# Patient Record
Sex: MALE | Race: WHITE | NOT HISPANIC OR LATINO | Employment: UNEMPLOYED | URBAN - METROPOLITAN AREA
[De-identification: names, ages, dates, MRNs, and addresses within clinical notes are randomized per-mention and may not be internally consistent; named-entity substitution may affect disease eponyms.]

---

## 2022-02-02 ENCOUNTER — OFFICE VISIT (OUTPATIENT)
Dept: GASTROENTEROLOGY | Facility: CLINIC | Age: 32
End: 2022-02-02
Payer: COMMERCIAL

## 2022-02-02 VITALS
HEIGHT: 71 IN | HEART RATE: 102 BPM | WEIGHT: 315 LBS | BODY MASS INDEX: 44.1 KG/M2 | SYSTOLIC BLOOD PRESSURE: 157 MMHG | DIASTOLIC BLOOD PRESSURE: 89 MMHG

## 2022-02-02 DIAGNOSIS — K62.5 RECTAL BLEEDING: Primary | ICD-10-CM

## 2022-02-02 DIAGNOSIS — E66.01 CLASS 3 SEVERE OBESITY DUE TO EXCESS CALORIES WITH SERIOUS COMORBIDITY AND BODY MASS INDEX (BMI) OF 60.0 TO 69.9 IN ADULT (HCC): ICD-10-CM

## 2022-02-02 PROBLEM — E66.813 CLASS 3 SEVERE OBESITY DUE TO EXCESS CALORIES WITH SERIOUS COMORBIDITY AND BODY MASS INDEX (BMI) OF 60.0 TO 69.9 IN ADULT (HCC): Status: ACTIVE | Noted: 2022-02-02

## 2022-02-02 PROCEDURE — 99204 OFFICE O/P NEW MOD 45 MIN: CPT | Performed by: INTERNAL MEDICINE

## 2022-02-02 RX ORDER — MONTELUKAST SODIUM 10 MG/1
10 TABLET ORAL DAILY
COMMUNITY
Start: 2012-09-22 | End: 2022-07-31

## 2022-02-02 RX ORDER — GEMFIBROZIL 600 MG/1
600 TABLET, FILM COATED ORAL
COMMUNITY

## 2022-02-02 RX ORDER — ALBUTEROL SULFATE 2.5 MG/3ML
2.5 SOLUTION RESPIRATORY (INHALATION)
COMMUNITY
Start: 2021-10-19

## 2022-02-02 RX ORDER — CLONAZEPAM 2 MG/1
TABLET ORAL 2 TIMES DAILY
COMMUNITY
Start: 2021-12-01

## 2022-02-02 RX ORDER — SILODOSIN 8 MG/1
8 CAPSULE ORAL DAILY
COMMUNITY

## 2022-02-02 RX ORDER — PRAZOSIN HYDROCHLORIDE 5 MG/1
CAPSULE ORAL
COMMUNITY
Start: 2021-12-01

## 2022-02-02 RX ORDER — DOCUSATE SODIUM 100 MG/1
100 CAPSULE, LIQUID FILLED ORAL 2 TIMES DAILY
COMMUNITY

## 2022-02-02 RX ORDER — GABAPENTIN 400 MG/1
CAPSULE ORAL
COMMUNITY
Start: 2021-12-01

## 2022-02-02 RX ORDER — BENZONATATE 100 MG/1
100 CAPSULE ORAL
COMMUNITY
Start: 2022-02-01 | End: 2022-02-11

## 2022-02-02 RX ORDER — FLUTICASONE FUROATE AND VILANTEROL 100; 25 UG/1; UG/1
1 POWDER RESPIRATORY (INHALATION)
COMMUNITY

## 2022-02-02 RX ORDER — ALBUTEROL SULFATE 90 UG/1
2 AEROSOL, METERED RESPIRATORY (INHALATION)
COMMUNITY
Start: 2021-12-27 | End: 2022-03-27

## 2022-02-02 RX ORDER — CHLORPROMAZINE HYDROCHLORIDE 100 MG/1
100 TABLET, FILM COATED ORAL 3 TIMES DAILY
COMMUNITY
Start: 2021-12-01

## 2022-02-02 RX ORDER — LITHIUM CARBONATE 300 MG/1
300 CAPSULE ORAL 2 TIMES DAILY
COMMUNITY
Start: 2021-12-01

## 2022-02-02 RX ORDER — BENZTROPINE MESYLATE 1 MG/1
TABLET ORAL
COMMUNITY
Start: 2021-12-01

## 2022-02-02 RX ORDER — BUPROPION HYDROCHLORIDE 150 MG/1
300 TABLET ORAL EVERY MORNING
COMMUNITY
Start: 2021-12-01

## 2022-02-02 RX ORDER — SERTRALINE HYDROCHLORIDE 100 MG/1
2 TABLET, FILM COATED ORAL DAILY
COMMUNITY
Start: 2021-12-01

## 2022-02-02 RX ORDER — PANTOPRAZOLE SODIUM 40 MG/1
40 TABLET, DELAYED RELEASE ORAL DAILY
COMMUNITY
Start: 2022-01-25 | End: 2022-03-08

## 2022-02-02 RX ORDER — LEVOTHYROXINE SODIUM 175 UG/1
125 TABLET ORAL DAILY
COMMUNITY

## 2022-02-02 NOTE — PATIENT INSTRUCTIONS
Scheduled date of colonoscopy (as of today): March 10 2022  Physician performing colonoscopy: Dr Rawls Artist  Location of colonoscopy: [place] [address]  Bowel prep reviewed with patient: Miralax w/ Dulcolax  Instructions reviewed with patient by: Claudia Rios  Clearances: None

## 2022-02-02 NOTE — PROGRESS NOTES
[facility] - Outpatient Consultation  Jocelyne James 33 y o  male MRN: [id number]  Encounter: [id number]        ASSESSMENT AND PLAN:      1  Rectal bleeding  2  Class 3 severe obesity due to excess calories with serious comorbidity and body mass index (BMI) of 60 0 to 69 9 in adult [facility])    We discussed the differential diagnosis  This may well represent hemorrhoidal bleeding though other possibilities including inflammatory bowel disease or other inflammatory or neoplastic processes are certainly possible  Will evaluate with colonoscopy  This will need to be performed in the operating room given his morbid obesity and sleep apnea  Will try to wait another 3-4 weeks given his recent COVID-19 infection     - Colonoscopy; Future        ______________________________________________________________________    HPI:  Patient presents with rectal bleeding  He presents with his mother who provides much of the information  He has had bright red blood per rectum off for the past few years but this was much more severe approximately 1-2 weeks ago when he presented to the emergency room on January 25th  Since that time his bleeding has resolved  He also reports some right-sided abdominal discomfort  Has a family history colon cancer in his maternal uncle, history of diverticulitis in his mother, history of Crohn's disease in his father and paternal grandmother  He has never had a colonoscopy  Patient smokes cigarettes  Patient had COVID approximately 4 weeks ago      REVIEW OF SYSTEMS:    Review of Systems   Constitutional: Positive for malaise/fatigue  Negative for decreased appetite  Respiratory: Positive for shortness of breath  Gastrointestinal: Positive for abdominal pain and hematochezia  Psychiatric/Behavioral: Positive for depression  All other systems reviewed and are negative         Historical Information   Past Medical History:   Diagnosis Date    Bipolar 1 disorder ([facility] [address] )     Depression     Overactive thyroid gland      Past Surgical History:   Procedure Laterality Date    LAPAROSCOPIC CHOLECYSTECTOMY  11/2020     Social History   Social History     Substance and Sexual Activity   Alcohol Use Not Currently     Social History     Substance and Sexual Activity   Drug Use Not on file     Social History     Tobacco Use   Smoking Status Smoker, Current Status Unknown    Packs/day: 1 50   Smokeless Tobacco Never Used     Family History   Problem Relation Age of Onset    Diverticulitis Mother     Crohn's disease Father     Diverticulitis Father     Diverticulitis Maternal Grandfather     Crohn's disease Paternal Grandmother     Colon cancer Maternal Uncle        Meds/Allergies       Current Outpatient Medications:     albuterol (2 5 mg/3 mL) 0 083 % nebulizer solution    albuterol (PROVENTIL HFA,VENTOLIN HFA) 90 mcg/act inhaler    benzonatate (TESSALON PERLES) 100 mg capsule    benztropine (COGENTIN) 1 mg tablet    buPROPion (WELLBUTRIN XL) 150 mg 24 hr tablet    chlorproMAZINE (THORAZINE) 100 mg tablet    clonazePAM (KlonoPIN) 2 mg tablet    docusate sodium (COLACE) 100 mg capsule    fluticasone-vilanterol (BREO ELLIPTA) 100-25 mcg/inh inhaler    gabapentin (NEURONTIN) 400 mg capsule    levothyroxine 175 mcg tablet    lithium carbonate 300 mg capsule    montelukast (SINGULAIR) 10 mg tablet    pantoprazole (PROTONIX) 40 mg tablet    prazosin (MINIPRESS) 5 mg capsule    sertraline (ZOLOFT) 100 mg tablet    Silodosin 8 MG CAPS    No Known Allergies        Objective     Blood pressure 157/89, pulse 102, height 5' 11" (1 803 m), weight (!) 210 kg (462 lb)  Body mass index is 64 44 kg/m²  PHYSICAL EXAM:      Physical Exam  Vitals and nursing note reviewed  Constitutional:       General: He is not in acute distress  Appearance: He is obese  HENT:      Head: Normocephalic and atraumatic  Eyes:      General: No scleral icterus       Pupils: Pupils are equal, round, and reactive to light  Cardiovascular:      Rate and Rhythm: Normal rate and regular rhythm  Pulmonary:      Effort: Pulmonary effort is normal  No respiratory distress  Abdominal:      General: There is no distension  Palpations: Abdomen is soft  Musculoskeletal:         General: Normal range of motion  Cervical back: Normal range of motion and neck supple  Skin:     General: Skin is warm and dry  Neurological:      General: No focal deficit present  Mental Status: He is alert and oriented to person, place, and time  Psychiatric:         Mood and Affect: Mood normal          Behavior: Behavior normal               Lab Results:   No visits with results within 1 Day(s) from this visit  Latest known visit with results is:   Generic Conversion - Encounter on 07/03/2013   Component Date Value    Glucose 07/02/2013 80     BUN 07/02/2013 14     Creatinine 07/02/2013 1 2     Sodium 07/02/2013 138     Potassium 07/02/2013 4 0     Chloride 07/02/2013 102     Alkaline Phosphatase 07/02/2013 92     Total Bilirubin 07/02/2013 0 4     ALT 07/02/2013 44     AST 07/02/2013 27     Albumin 07/02/2013 3 9     CO2 07/02/2013 27     Cholesterol 07/02/2013 141     Triglycerides 07/02/2013 229     HDL 07/02/2013 29     LDL Calculated 07/02/2013 66     TSH 3RD GENERATON 07/02/2013 2 84          Radiology Results:   No results found

## 2022-03-10 ENCOUNTER — HOSPITAL ENCOUNTER (OUTPATIENT)
Dept: PERIOP | Facility: HOSPITAL | Age: 32
Setting detail: OUTPATIENT SURGERY
Discharge: HOME | End: 2022-03-10
Attending: INTERNAL MEDICINE | Admitting: INTERNAL MEDICINE
Payer: COMMERCIAL

## 2022-03-10 ENCOUNTER — ANESTHESIA EVENT (OUTPATIENT)
Dept: PERIOP | Facility: HOSPITAL | Age: 32
End: 2022-03-10

## 2022-03-10 ENCOUNTER — ANESTHESIA (OUTPATIENT)
Dept: PERIOP | Facility: HOSPITAL | Age: 32
End: 2022-03-10

## 2022-03-10 VITALS
OXYGEN SATURATION: 95 % | SYSTOLIC BLOOD PRESSURE: 136 MMHG | BODY MASS INDEX: 44.1 KG/M2 | HEIGHT: 71 IN | WEIGHT: 315 LBS | TEMPERATURE: 97.6 F | DIASTOLIC BLOOD PRESSURE: 57 MMHG | HEART RATE: 77 BPM | RESPIRATION RATE: 20 BRPM

## 2022-03-10 DIAGNOSIS — K62.5 RECTAL BLEEDING: ICD-10-CM

## 2022-03-10 PROBLEM — J45.909 ASTHMA: Status: ACTIVE | Noted: 2022-03-10

## 2022-03-10 PROBLEM — J44.9 CHRONIC OBSTRUCTIVE PULMONARY DISEASE (HCC): Status: ACTIVE | Noted: 2022-03-10

## 2022-03-10 PROCEDURE — 45380 COLONOSCOPY AND BIOPSY: CPT | Performed by: INTERNAL MEDICINE

## 2022-03-10 PROCEDURE — 45385 COLONOSCOPY W/LESION REMOVAL: CPT | Performed by: INTERNAL MEDICINE

## 2022-03-10 PROCEDURE — 88305 TISSUE EXAM BY PATHOLOGIST: CPT | Performed by: PATHOLOGY

## 2022-03-10 RX ORDER — PROPOFOL 10 MG/ML
INJECTION, EMULSION INTRAVENOUS AS NEEDED
Status: DISCONTINUED | OUTPATIENT
Start: 2022-03-10 | End: 2022-03-10

## 2022-03-10 RX ORDER — SODIUM CHLORIDE, SODIUM LACTATE, POTASSIUM CHLORIDE, CALCIUM CHLORIDE 600; 310; 30; 20 MG/100ML; MG/100ML; MG/100ML; MG/100ML
INJECTION, SOLUTION INTRAVENOUS CONTINUOUS PRN
Status: DISCONTINUED | OUTPATIENT
Start: 2022-03-10 | End: 2022-03-10

## 2022-03-10 RX ORDER — LIDOCAINE HYDROCHLORIDE 10 MG/ML
INJECTION, SOLUTION EPIDURAL; INFILTRATION; INTRACAUDAL; PERINEURAL AS NEEDED
Status: DISCONTINUED | OUTPATIENT
Start: 2022-03-10 | End: 2022-03-10

## 2022-03-10 RX ORDER — ONDANSETRON 2 MG/ML
4 INJECTION INTRAMUSCULAR; INTRAVENOUS ONCE AS NEEDED
Status: CANCELLED | OUTPATIENT
Start: 2022-03-10

## 2022-03-10 RX ORDER — PROPOFOL 10 MG/ML
INJECTION, EMULSION INTRAVENOUS CONTINUOUS PRN
Status: DISCONTINUED | OUTPATIENT
Start: 2022-03-10 | End: 2022-03-10

## 2022-03-10 RX ADMIN — LIDOCAINE HYDROCHLORIDE 50 MG: 10 INJECTION, SOLUTION EPIDURAL; INFILTRATION; INTRACAUDAL; PERINEURAL at 11:16

## 2022-03-10 RX ADMIN — PROPOFOL 50 MCG/KG/MIN: 10 INJECTION, EMULSION INTRAVENOUS at 11:19

## 2022-03-10 RX ADMIN — PROPOFOL 40 MG: 10 INJECTION, EMULSION INTRAVENOUS at 11:18

## 2022-03-10 RX ADMIN — PROPOFOL 40 MG: 10 INJECTION, EMULSION INTRAVENOUS at 11:17

## 2022-03-10 RX ADMIN — SODIUM CHLORIDE, SODIUM LACTATE, POTASSIUM CHLORIDE, AND CALCIUM CHLORIDE: .6; .31; .03; .02 INJECTION, SOLUTION INTRAVENOUS at 11:13

## 2022-03-10 RX ADMIN — PROPOFOL 100 MG: 10 INJECTION, EMULSION INTRAVENOUS at 11:16
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
## 2022-03-10 NOTE — ANESTHESIA PREPROCEDURE EVALUATION
Procedure:  COLONOSCOPY    Relevant Problems   ANESTHESIA (within normal limits)      CARDIO (within normal limits)      PULMONARY   (+) Asthma   (+) Chronic obstructive pulmonary disease (HCC)        Physical Exam    Airway    Mallampati score: II  TM Distance: >3 FB  Neck ROM: full     Dental   No notable dental hx     Cardiovascular  Rhythm: regular, Rate: normal,     Pulmonary  Breath sounds clear to auscultation,     Other Findings        Anesthesia Plan  ASA Score- 3     Anesthesia Type- IV sedation with anesthesia with ASA Monitors  Additional Monitors:   Airway Plan:           Plan Factors-Exercise tolerance (METS): >4 METS  Chart reviewed  Existing labs reviewed  Patient summary reviewed  Patient is not a current smoker  Induction-     Postoperative Plan-     Informed Consent- Anesthetic plan and risks discussed with patient  I personally reviewed this patient with the CRNA  Discussed and agreed on the Anesthesia Plan with the CRNA  Alana Maher

## 2022-03-10 NOTE — ANESTHESIA POSTPROCEDURE EVALUATION
Post-Op Assessment Note    CV Status:  Stable  Pain Score: 0    Pain management: adequate     Mental Status:  Sleepy   Hydration Status:  Stable   PONV Controlled:  None   Airway Patency:  Patent   Two or more mitigation strategies used for obstructive sleep apnea   Post Op Vitals Reviewed: Yes      Staff: CRNA         No complications documented      BP   139/75   Temp 97   Pulse 90   Resp 16   SpO2 94

## 2022-03-17 NOTE — RESULT ENCOUNTER NOTE
Please call the patient regarding his result  One polyp was completely benign the other polyp broke apart during processing and could not be adequately evaluated  To be on the safe side, in case this was a precancerous polyp, would recommend repeat colonoscopy in 3 years

## 2025-02-13 ENCOUNTER — TELEPHONE (OUTPATIENT)
Dept: GASTROENTEROLOGY | Facility: CLINIC | Age: 35
End: 2025-02-13